# Patient Record
Sex: FEMALE | Race: WHITE | NOT HISPANIC OR LATINO | Employment: OTHER | URBAN - METROPOLITAN AREA
[De-identification: names, ages, dates, MRNs, and addresses within clinical notes are randomized per-mention and may not be internally consistent; named-entity substitution may affect disease eponyms.]

---

## 2019-11-05 ENCOUNTER — HISTORICAL (OUTPATIENT)
Dept: LAB | Facility: HOSPITAL | Age: 59
End: 2019-11-05

## 2020-03-16 ENCOUNTER — HISTORICAL (OUTPATIENT)
Dept: LAB | Facility: HOSPITAL | Age: 60
End: 2020-03-16

## 2020-03-19 LAB — FINAL CULTURE: NORMAL

## 2022-11-15 PROBLEM — D64.9 ANEMIA: Status: ACTIVE | Noted: 2022-11-15

## 2022-11-15 NOTE — PROGRESS NOTES
Subjective:       Patient ID: Janette Whitfield is a 62 y.o. female.    Chief Complaint: Follow-up (Pt states that she has been vomiting bile for the past 2 days she is unable to eat and she is also a diabetic, pt states she is not feeling to well today, fatigue, stomach pain, jittery, pt states she took her meds this morning but she most likely when she vomit they came up )      HPI      Janette Whitfield  62 y.o.  female with past medical history significant for bipolar disorder, HTN, DM, CVA, scarlet fever, seizures, asthma, syncope, COPD, depression, HLD, anxiety, fibromyalgia, scabies, PUD/GERD, accidental lithium overdose referred to [facility] for anemia and thrombocytosis    Colonoscopy 4/27/20 with Dr De La Rosa, internal hemorrhoids, polyp, diverticulosis.    Interval History:     Patient is here for follow-up and complaining of some nausea with vomiting for the last 2 days.  She just had a cholecystectomy done last month and is going to surgery clinic after here to be evaluated for this is abdominal pain and vomiting with a plan to remove the staples.     She has been taking iron twice daily, folic acid daily and has not been taking vitamin B12 for more than a year now.  Denies any blood in urine bowel movements other than having intermittent small amount of blood from hemorrhoids.  Denies any vaginal bleeding.  Denies any chest pain ,shortness of breath or palpitations.           Past Medical History:   Diagnosis Date    Anemia, unspecified       Past Surgical History:   Procedure Laterality Date    CHOLECYSTECTOMY       Social History     Socioeconomic History    Marital status: Unknown   Tobacco Use    Smoking status: Never    Smokeless tobacco: Never   Substance and Sexual Activity    Alcohol use: Not Currently    Drug use: Never    Sexual activity: Not Currently      History reviewed. No pertinent family history.   Review of patient's allergies indicates:   Allergen Reactions    Meperidine Other (See  Comments) and Shortness Of Breath     Pt stated it gives her the demerol jumps  Pt stated it gives her the demerol jumps      Hydrocodone Nausea Only      Review of Systems   Constitutional: Negative.  Negative for activity change, chills, fatigue and fever.   HENT: Negative.     Eyes: Negative.    Respiratory:  Negative for cough and shortness of breath.    Cardiovascular:  Negative for chest pain and leg swelling.   Gastrointestinal:  Positive for abdominal distention, nausea and vomiting. Negative for constipation and diarrhea.   Endocrine: Negative.    Genitourinary: Negative.    Musculoskeletal:  Negative for arthralgias and myalgias.   Integumentary:  Negative.   Allergic/Immunologic: Negative.    Neurological:  Negative for light-headedness, numbness and headaches.   Hematological: Negative.    Psychiatric/Behavioral: Negative.         Objective:        Vitals:    11/16/22 1041   BP: (!) 147/85   Pulse: (!) 126   Resp: 18   Temp: 98.4 °F (36.9 °C)        Physical Exam  Constitutional:       General: She is not in acute distress.     Appearance: She is well-developed. She is not ill-appearing.   HENT:      Head: Normocephalic and atraumatic.      Mouth/Throat:      Mouth: Mucous membranes are moist.   Eyes:      Extraocular Movements: Extraocular movements intact.      Conjunctiva/sclera: Conjunctivae normal.   Cardiovascular:      Rate and Rhythm: Normal rate and regular rhythm.      Heart sounds: Normal heart sounds.   Pulmonary:      Effort: Pulmonary effort is normal. No respiratory distress.      Breath sounds: Normal breath sounds. No wheezing.   Abdominal:      General: Bowel sounds are normal. There is no distension.      Palpations: Abdomen is soft.      Tenderness: There is abdominal tenderness.      Comments: Dressing at site of surgery   Musculoskeletal:         General: Normal range of motion.      Cervical back: Normal range of motion and neck supple.   Skin:     General: Skin is warm.    Neurological:      General: No focal deficit present.      Mental Status: She is alert and oriented to person, place, and time. Mental status is at baseline.      Cranial Nerves: No cranial nerve deficit.   Psychiatric:         Mood and Affect: Mood normal.       LABS AND IMAGING REVIEWED IN EPIC    22:     WBC 7, hemoglobin 10.4, MCV 88, platelets 614  2022.  WBC 12.99, hemoglobin 9.6, MCV 83.3, platelets 921, creatinine 0.64,  22 WBC 7.1, Hgb 12.2, , , iron 290, folate  16.2, B12 410, Ferritin 47, Retic 1.6%  21 WBC 9.4 RBC 4.49 Hg 12.7 MCV 86 RDW 13.2  ANC 6.2 Cr 0.76 Alb 4.6 TP 7.6 AlkPhos 168 AST 16 ALT 20 Folate 15.6 B12 442 Iron 68 Ferritin 33 Retic 1.9%  21 WBC 7.9, Hgb 12.7, MCV 84, , ANC 5.7, Cr 0.79, AlkPhos 176, iron 79, TIBC 303, Ferritin 38, folate > 20, B12 556, retic 1.5%  21 WBC 7.8, Hg 11.7, MCV 83, , ANC 5.1, Cr 0.72, alkphos 177, iron 41, TIBC 306, ferritin 17, folate 7.6, vitamin B12 379, reticulocyte 1.5%  7/15/20 Hgb 11.7, MCV 74, , Retic 1.4%, RDW 22.1, AlkPhos 159, , Iron 98, TIBC 291, Ferritin 25, B12 480, Folate 15.7, CRP 7.98 ESR 40, EPO 21.3  3/3/20 MMA 0.14 FOBT x3 negative Cr 0.79  Iron 19 TIBC 358 Ferritin 22 folate 7.33 12 272 Hep B s ag neg Hep B s ab neg HIV neg Hep C neg WBC 10.3 RBC 3.83 Hg 9.6 MCV 79.4 RDW 14.3  ANC 7.4 Abs retic ~75k Direct Noe neg MIC neg Copper 162 Hapto 167 SPEP normal SFLC normal  1/15/20 TIBC 327 Iron 49 B12 277 ferritin 19 WBC 10.2 Hg 10.3 MCV 82 RDW 16.3  ANC 6.9 ALC 1.9 AMC 1 AEC 0.1 ABC 0.1 TSH 2.43 FT4 0.87  19 Cr 0.78 Tbili 0.2 AST 15 ALT 17 WBC 11.06 RBC 3.64 Hg 9.3 MCV 81.3  RDW 15.1 ANC 8.06  18 Hg 11.6 MCV 81.8   8/27/15 Hg 14.6 MCV 87          Imagin21 MMG: benign  19 head CT: Mild volume loss, with no acute abnormality  19 US right breast: multiple small subcm cysts in right breast. Not  suspicious for malignancy  6/28/19 MMG: 3 round to ovoid masses in right breast up to 12mm        Assessment:       1. Anemia D64.9  - Likely due to polypharmacy and multiple psychiatric meds. Tegretol most likely culprit  - Initial workup showed borderline iron, B12 and folic acid indices, started on all 3 4/2020  - In the past she has been noncompliant with oral iron, B12 and folic acid.  - Most recent lab work shows improvement in ferritin, B12 and folate level. Hgb also improved at 12.2  - 11/14/22: ferritin 151, retic 4.3, B12 733, folate 12, iron saturation 14    Thrombocytosis:   - Thrombocytosis likely reactive after cholecystectomy.  platelet counts have been around 400 but after cholecystectomy were noted to be 921 and repeated with level of 614, which is trending down, does will continue to monitor for now  Plan:     - C/w oral iron daily, folic acid.   -  normal B12 despite not being on an B12 for a year so will hold onto restarting it  - Colonoscopy 4/27/20, will obtain path from polypectomy. Repeat colonoscopy due in 4 years per patient.  - She is considering hemorrhoidal banding with Dr. De La Rosa.  - Occasional abnormal bruising likely due to trazodone and duloxetine  - prescribed Zofran and Reglan prn for nausea and vomiting  - recommended to follow up with the surgeon today for evaluation of the abdominal pain and nausea and vomiting.  If unable to see the surgeon then she should go to the ER  - MD / LABS VISIT - 6 WEEKS     The patient was seen, interviewed and examined. Pertinent lab and radiology studies were reviewed. Pt instructed to call should develop concerning signs/symptoms or have further questions.       Madhu Venegas MD  Hematology / Oncology

## 2022-11-16 ENCOUNTER — OFFICE VISIT (OUTPATIENT)
Dept: HEMATOLOGY/ONCOLOGY | Facility: CLINIC | Age: 62
End: 2022-11-16
Payer: MEDICAID

## 2022-11-16 VITALS
HEART RATE: 126 BPM | OXYGEN SATURATION: 97 % | WEIGHT: 231.38 LBS | DIASTOLIC BLOOD PRESSURE: 85 MMHG | RESPIRATION RATE: 18 BRPM | SYSTOLIC BLOOD PRESSURE: 147 MMHG | TEMPERATURE: 98 F

## 2022-11-16 DIAGNOSIS — R11.14 BILIOUS VOMITING WITH NAUSEA: ICD-10-CM

## 2022-11-16 DIAGNOSIS — R10.13 DYSPEPSIA: Primary | ICD-10-CM

## 2022-11-16 DIAGNOSIS — D75.839 THROMBOCYTOSIS: ICD-10-CM

## 2022-11-16 DIAGNOSIS — D64.9 ANEMIA, UNSPECIFIED TYPE: ICD-10-CM

## 2022-11-16 PROCEDURE — 99214 OFFICE O/P EST MOD 30 MIN: CPT | Mod: ,,, | Performed by: INTERNAL MEDICINE

## 2022-11-16 PROCEDURE — 99214 PR OFFICE/OUTPT VISIT, EST, LEVL IV, 30-39 MIN: ICD-10-PCS | Mod: ,,, | Performed by: INTERNAL MEDICINE

## 2022-11-16 PROCEDURE — 1160F PR REVIEW ALL MEDS BY PRESCRIBER/CLIN PHARMACIST DOCUMENTED: ICD-10-PCS | Mod: CPTII,,, | Performed by: INTERNAL MEDICINE

## 2022-11-16 PROCEDURE — 3079F DIAST BP 80-89 MM HG: CPT | Mod: CPTII,,, | Performed by: INTERNAL MEDICINE

## 2022-11-16 PROCEDURE — 1160F RVW MEDS BY RX/DR IN RCRD: CPT | Mod: CPTII,,, | Performed by: INTERNAL MEDICINE

## 2022-11-16 PROCEDURE — 1159F MED LIST DOCD IN RCRD: CPT | Mod: CPTII,,, | Performed by: INTERNAL MEDICINE

## 2022-11-16 PROCEDURE — 3077F SYST BP >= 140 MM HG: CPT | Mod: CPTII,,, | Performed by: INTERNAL MEDICINE

## 2022-11-16 PROCEDURE — 3079F PR MOST RECENT DIASTOLIC BLOOD PRESSURE 80-89 MM HG: ICD-10-PCS | Mod: CPTII,,, | Performed by: INTERNAL MEDICINE

## 2022-11-16 PROCEDURE — 3077F PR MOST RECENT SYSTOLIC BLOOD PRESSURE >= 140 MM HG: ICD-10-PCS | Mod: CPTII,,, | Performed by: INTERNAL MEDICINE

## 2022-11-16 PROCEDURE — 1159F PR MEDICATION LIST DOCUMENTED IN MEDICAL RECORD: ICD-10-PCS | Mod: CPTII,,, | Performed by: INTERNAL MEDICINE

## 2022-11-16 RX ORDER — GABAPENTIN 300 MG/1
300 CAPSULE ORAL 3 TIMES DAILY
COMMUNITY
Start: 2022-10-10

## 2022-11-16 RX ORDER — FOLIC ACID 1 MG/1
1000 TABLET ORAL DAILY
COMMUNITY
Start: 2022-08-02 | End: 2023-12-05 | Stop reason: SDUPTHER

## 2022-11-16 RX ORDER — ONDANSETRON 8 MG/1
8 TABLET, ORALLY DISINTEGRATING ORAL EVERY 12 HOURS PRN
Qty: 45 TABLET | Refills: 3 | Status: SHIPPED | OUTPATIENT
Start: 2022-11-16

## 2022-11-16 RX ORDER — CLONAZEPAM 2 MG/1
2 TABLET ORAL 2 TIMES DAILY
COMMUNITY
Start: 2022-10-19

## 2022-11-16 RX ORDER — ONDANSETRON 8 MG/1
8 TABLET, ORALLY DISINTEGRATING ORAL 3 TIMES DAILY PRN
COMMUNITY
Start: 2022-09-07 | End: 2022-11-16 | Stop reason: SDUPTHER

## 2022-11-16 RX ORDER — PANTOPRAZOLE SODIUM 40 MG/1
40 TABLET, DELAYED RELEASE ORAL DAILY
COMMUNITY
Start: 2022-10-26

## 2022-11-16 RX ORDER — DULOXETIN HYDROCHLORIDE 60 MG/1
60 CAPSULE, DELAYED RELEASE ORAL 2 TIMES DAILY
COMMUNITY
Start: 2022-10-26

## 2022-11-16 RX ORDER — FLUTICASONE PROPIONATE 50 MCG
1 SPRAY, SUSPENSION (ML) NASAL 2 TIMES DAILY
COMMUNITY

## 2022-11-16 RX ORDER — METOCLOPRAMIDE 5 MG/1
5 TABLET ORAL
Qty: 20 TABLET | Refills: 0 | Status: SHIPPED | OUTPATIENT
Start: 2022-11-16 | End: 2023-11-16

## 2022-11-16 RX ORDER — METOPROLOL SUCCINATE 50 MG/1
50 TABLET, EXTENDED RELEASE ORAL DAILY
COMMUNITY
Start: 2022-10-31

## 2022-11-16 RX ORDER — FAMOTIDINE 40 MG/1
40 TABLET, FILM COATED ORAL 2 TIMES DAILY
COMMUNITY
Start: 2022-09-07

## 2022-11-16 RX ORDER — AMLODIPINE BESYLATE 10 MG/1
10 TABLET ORAL DAILY
COMMUNITY
Start: 2022-11-10

## 2022-11-16 RX ORDER — DUPILUMAB 300 MG/2ML
300 INJECTION, SOLUTION SUBCUTANEOUS
COMMUNITY

## 2022-11-16 RX ORDER — FERROUS SULFATE 325(65) MG
325 TABLET ORAL DAILY
Qty: 60 TABLET | Refills: 5 | Status: SHIPPED | OUTPATIENT
Start: 2022-11-16 | End: 2023-12-05 | Stop reason: SDUPTHER

## 2022-11-16 RX ORDER — METFORMIN HYDROCHLORIDE 500 MG/1
500 TABLET ORAL 2 TIMES DAILY
COMMUNITY
Start: 2022-08-31

## 2022-11-16 RX ORDER — TIZANIDINE 4 MG/1
4 TABLET ORAL 2 TIMES DAILY
COMMUNITY
Start: 2022-11-10

## 2022-11-16 RX ORDER — CARBAMAZEPINE 400 MG/1
400 TABLET, EXTENDED RELEASE ORAL 2 TIMES DAILY
COMMUNITY
Start: 2022-07-01

## 2022-11-16 RX ORDER — PREDNISONE 20 MG/1
20 TABLET ORAL 2 TIMES DAILY
COMMUNITY
Start: 2022-09-12

## 2022-11-16 RX ORDER — ALBUTEROL SULFATE 0.83 MG/ML
2.5 SOLUTION RESPIRATORY (INHALATION) EVERY 6 HOURS PRN
COMMUNITY
Start: 2022-09-12

## 2022-11-16 RX ORDER — FUROSEMIDE 80 MG/1
80 TABLET ORAL DAILY PRN
COMMUNITY

## 2022-11-16 RX ORDER — ATORVASTATIN CALCIUM 40 MG/1
40 TABLET, FILM COATED ORAL DAILY
COMMUNITY
Start: 2022-10-26

## 2022-11-16 RX ORDER — LEVOCETIRIZINE DIHYDROCHLORIDE 5 MG/1
5 TABLET, FILM COATED ORAL DAILY
COMMUNITY
Start: 2022-10-01

## 2022-11-16 RX ORDER — FERROUS SULFATE 325(65) MG
325 TABLET ORAL 2 TIMES DAILY
COMMUNITY
End: 2022-11-16 | Stop reason: SDUPTHER

## 2022-11-16 RX ORDER — HYDROCODONE BITARTRATE AND ACETAMINOPHEN 10; 325 MG/1; MG/1
1 TABLET ORAL EVERY 6 HOURS PRN
COMMUNITY
Start: 2022-10-19

## 2022-11-16 RX ORDER — TRAZODONE HYDROCHLORIDE 100 MG/1
200 TABLET ORAL NIGHTLY
COMMUNITY
Start: 2022-10-26

## 2022-11-16 RX ORDER — ERGOCALCIFEROL 1.25 MG/1
50000 CAPSULE ORAL
COMMUNITY
Start: 2022-10-26

## 2022-11-16 RX ORDER — BLOOD SUGAR DIAGNOSTIC
STRIP MISCELLANEOUS
COMMUNITY
Start: 2022-07-26

## 2023-01-26 ENCOUNTER — OFFICE VISIT (OUTPATIENT)
Dept: HEMATOLOGY/ONCOLOGY | Facility: CLINIC | Age: 63
End: 2023-01-26
Payer: MEDICAID

## 2023-01-26 VITALS
HEART RATE: 89 BPM | DIASTOLIC BLOOD PRESSURE: 71 MMHG | HEIGHT: 65 IN | OXYGEN SATURATION: 95 % | BODY MASS INDEX: 38.15 KG/M2 | RESPIRATION RATE: 18 BRPM | WEIGHT: 229 LBS | TEMPERATURE: 98 F | SYSTOLIC BLOOD PRESSURE: 106 MMHG

## 2023-01-26 DIAGNOSIS — D75.839 THROMBOCYTOSIS: ICD-10-CM

## 2023-01-26 DIAGNOSIS — D64.9 ANEMIA, UNSPECIFIED TYPE: Primary | ICD-10-CM

## 2023-01-26 PROCEDURE — 3078F DIAST BP <80 MM HG: CPT | Mod: CPTII,,,

## 2023-01-26 PROCEDURE — 1159F MED LIST DOCD IN RCRD: CPT | Mod: CPTII,,,

## 2023-01-26 PROCEDURE — 99214 OFFICE O/P EST MOD 30 MIN: CPT | Mod: ,,,

## 2023-01-26 PROCEDURE — 3074F PR MOST RECENT SYSTOLIC BLOOD PRESSURE < 130 MM HG: ICD-10-PCS | Mod: CPTII,,,

## 2023-01-26 PROCEDURE — 3074F SYST BP LT 130 MM HG: CPT | Mod: CPTII,,,

## 2023-01-26 PROCEDURE — 3008F BODY MASS INDEX DOCD: CPT | Mod: CPTII,,,

## 2023-01-26 PROCEDURE — 3078F PR MOST RECENT DIASTOLIC BLOOD PRESSURE < 80 MM HG: ICD-10-PCS | Mod: CPTII,,,

## 2023-01-26 PROCEDURE — 3008F PR BODY MASS INDEX (BMI) DOCUMENTED: ICD-10-PCS | Mod: CPTII,,,

## 2023-01-26 PROCEDURE — 1159F PR MEDICATION LIST DOCUMENTED IN MEDICAL RECORD: ICD-10-PCS | Mod: CPTII,,,

## 2023-01-26 PROCEDURE — 99214 PR OFFICE/OUTPT VISIT, EST, LEVL IV, 30-39 MIN: ICD-10-PCS | Mod: ,,,

## 2023-01-26 RX ORDER — ASPIRIN 81 MG/1
81 TABLET ORAL DAILY
COMMUNITY

## 2023-01-26 RX ORDER — ESOMEPRAZOLE MAGNESIUM 40 MG/1
40 CAPSULE, DELAYED RELEASE ORAL
COMMUNITY

## 2023-01-26 RX ORDER — BREXPIPRAZOLE 2 MG/1
2 TABLET ORAL
COMMUNITY

## 2023-01-26 RX ORDER — BISMUTH SUBCITRATE POTASSIUM, METRONIDAZOLE AND TETRACYCLINE HYDROCHLORIDE 140; 125; 125 MG/1; MG/1; MG/1
3 CAPSULE ORAL
COMMUNITY

## 2023-01-26 NOTE — PROGRESS NOTES
Subjective:       Patient ID: Janette Whitfield is a 62 y.o. female.    Chief Complaint: Follow-up        HPI      Janette Whitfield  62 y.o.  female with past medical history significant for bipolar disorder, HTN, DM, CVA, scarlet fever, seizures, asthma, syncope, COPD, depression, HLD, anxiety, fibromyalgia, scabies, PUD/GERD, accidental lithium overdose referred to [facility] for anemia and thrombocytosis    Colonoscopy 4/27/20 with Dr De La Rosa, internal hemorrhoids, polyp, diverticulosis.    Interval History:     Today 1/26/23: Patient seen today in the office for follow-up visit. She states she has been taking oral iron twice daily and folic acid and B12 once daily as directed. She denies fevers, chills, weight loss, early satiety. Denies chris hematochezia, melena, denies hematuria. She does suffer from hemorrhoids and states occasionally she does have some blood on the toilet paper after BMs. She is considering scheduling an appointment with Dr. De La Rosa for hemorrhoidal banding procedure. Her depression symptoms are well controlled at this time. She states Dr. Harrison left and she does not have any refills on her Trazodone. She is asking for 1 month supply of Trazodone as she suffers from insomnia and has been on this medication for years. She has been looking for a new PCP but states it is hard to find provider who accepts medicaid. Denies CP, SOB, N/V/D. She continues to report itchy skin and continues with multiple skin excoriations to legs and arms. She was seen by Dr. Anthony in [place] and is applying prescription cream as recommended. No other complaints or concerns reported.  Patient is here for follow-up and complaining of some nausea with vomiting for the last 2 days.  She just had a cholecystectomy done last month and is going to surgery clinic after here to be evaluated for this is abdominal pain and vomiting with a plan to remove the staples.     She has been taking iron twice daily, folic acid daily  and has not been taking vitamin B12 for more than a year now.  Denies any blood in urine bowel movements other than having intermittent small amount of blood from hemorrhoids.  Denies any vaginal bleeding.  Denies any chest pain ,shortness of breath or palpitations.           Past Medical History:   Diagnosis Date    Anemia, unspecified       Past Surgical History:   Procedure Laterality Date    CHOLECYSTECTOMY       Social History     Socioeconomic History    Marital status: Unknown   Tobacco Use    Smoking status: Never    Smokeless tobacco: Never   Substance and Sexual Activity    Alcohol use: Not Currently    Drug use: Never    Sexual activity: Not Currently      No family history on file.   Review of patient's allergies indicates:   Allergen Reactions    Meperidine Other (See Comments) and Shortness Of Breath     Pt stated it gives her the demerol jumps  Pt stated it gives her the demerol jumps      Hydrocodone Nausea Only      Review of Systems   Constitutional: Negative.  Negative for activity change, chills, fatigue and fever.   HENT: Negative.     Eyes: Negative.    Respiratory:  Negative for cough and shortness of breath.    Cardiovascular:  Negative for chest pain and leg swelling.   Gastrointestinal:  Positive for abdominal distention, nausea and vomiting. Negative for constipation and diarrhea.   Endocrine: Negative.    Genitourinary: Negative.    Musculoskeletal:  Negative for arthralgias and myalgias.   Integumentary:  Negative.   Allergic/Immunologic: Negative.    Neurological:  Negative for light-headedness, numbness and headaches.   Hematological: Negative.    Psychiatric/Behavioral: Negative.         Objective:        There were no vitals filed for this visit.       Physical Exam  Constitutional:       General: She is not in acute distress.     Appearance: She is well-developed. She is not ill-appearing.   HENT:      Head: Normocephalic and atraumatic.      Mouth/Throat:      Mouth: Mucous  membranes are moist.   Eyes:      Extraocular Movements: Extraocular movements intact.      Conjunctiva/sclera: Conjunctivae normal.   Cardiovascular:      Rate and Rhythm: Normal rate and regular rhythm.      Heart sounds: Normal heart sounds.   Pulmonary:      Effort: Pulmonary effort is normal. No respiratory distress.      Breath sounds: Normal breath sounds. No wheezing.   Abdominal:      General: Bowel sounds are normal. There is no distension.      Palpations: Abdomen is soft.      Tenderness: There is abdominal tenderness.      Comments: Dressing at site of surgery   Musculoskeletal:         General: Normal range of motion.      Cervical back: Normal range of motion and neck supple.   Skin:     General: Skin is warm.   Neurological:      General: No focal deficit present.      Mental Status: She is alert and oriented to person, place, and time. Mental status is at baseline.      Cranial Nerves: No cranial nerve deficit.   Psychiatric:         Mood and Affect: Mood normal.       LABS AND IMAGING REVIEWED IN EPIC  01/26/23 01/04/23: Cr 0.74, ca 9.3, alb 3.3, alkphos 178, wbc 11.56, hgb 11.2, plt 504  11/14/22:  WBC 7, hemoglobin 10.4, MCV 88, platelets 614  11/03/2022.  WBC 12.99, hemoglobin 9.6, MCV 83.3, platelets 921, creatinine 0.64,  1/20/22 WBC 7.1, Hgb 12.2, , , iron 290, folate  16.2, B12 410, Ferritin 47, Retic 1.6%  7/16/21 WBC 9.4 RBC 4.49 Hg 12.7 MCV 86 RDW 13.2  ANC 6.2 Cr 0.76 Alb 4.6 TP 7.6 AlkPhos 168 AST 16 ALT 20 Folate 15.6 B12 442 Iron 68 Ferritin 33 Retic 1.9%  4/30/21 WBC 7.9, Hgb 12.7, MCV 84, , ANC 5.7, Cr 0.79, AlkPhos 176, iron 79, TIBC 303, Ferritin 38, folate > 20, B12 556, retic 1.5%  2/17/21 WBC 7.8, Hg 11.7, MCV 83, , ANC 5.1, Cr 0.72, alkphos 177, iron 41, TIBC 306, ferritin 17, folate 7.6, vitamin B12 379, reticulocyte 1.5%  7/15/20 Hgb 11.7, MCV 74, , Retic 1.4%, RDW 22.1, AlkPhos 159, , Iron 98, TIBC 291, Ferritin 25, B12 480,  Folate 15.7, CRP 7.98 ESR 40, EPO 21.3  3/3/20 MMA 0.14 FOBT x3 negative Cr 0.79  Iron 19 TIBC 358 Ferritin 22 folate 7.33 12 272 Hep B s ag neg Hep B s ab neg HIV neg Hep C neg WBC 10.3 RBC 3.83 Hg 9.6 MCV 79.4 RDW 14.3  ANC 7.4 Abs retic ~75k Direct Noe neg MIC neg Copper 162 Hapto 167 SPEP normal SFLC normal  1/15/20 TIBC 327 Iron 49 B12 277 ferritin 19 WBC 10.2 Hg 10.3 MCV 82 RDW 16.3  ANC 6.9 ALC 1.9 AMC 1 AEC 0.1 ABC 0.1 TSH 2.43 FT4 0.87  19 Cr 0.78 Tbili 0.2 AST 15 ALT 17 WBC 11.06 RBC 3.64 Hg 9.3 MCV 81.3  RDW 15.1 ANC 8.06  18 Hg 11.6 MCV 81.8   8/27/15 Hg 14.6 MCV 87         Imagin21 MMG: benign  19 head CT: Mild volume loss, with no acute abnormality  19 US right breast: multiple small subcm cysts in right breast. Not suspicious for malignancy  19 MMG: 3 round to ovoid masses in right breast up to 12mm        Assessment:       1. Anemia D64.9  - Likely due to polypharmacy and multiple psychiatric meds. Tegretol most likely culprit  - Initial workup showed borderline iron, B12 and folic acid indices, started on all 3 2020  - In the past she has been noncompliant with oral iron, B12 and folic acid.  - Most recent lab work shows improvement in ferritin, B12 and folate level. Hgb also improved at 12.2  - 22: ferritin 151, retic 4.3, B12 733, folate 12, iron saturation 14    Thrombocytosis:   - Thrombocytosis likely reactive after cholecystectomy and s/p right total knee  platelet counts have been around 400-500 but after cholecystectomy and total knee were noted to be 921 and repeated with level of 614, which is trending down, does will continue to monitor for now  Platelets 504 today  Plan:     - C/w oral iron daily, folic acid.  - RTC 8 months

## 2023-12-05 ENCOUNTER — OFFICE VISIT (OUTPATIENT)
Dept: HEMATOLOGY/ONCOLOGY | Facility: CLINIC | Age: 63
End: 2023-12-05
Payer: COMMERCIAL

## 2023-12-05 VITALS
RESPIRATION RATE: 20 BRPM | WEIGHT: 248 LBS | TEMPERATURE: 98 F | BODY MASS INDEX: 41.32 KG/M2 | HEART RATE: 87 BPM | SYSTOLIC BLOOD PRESSURE: 117 MMHG | DIASTOLIC BLOOD PRESSURE: 80 MMHG | HEIGHT: 65 IN | OXYGEN SATURATION: 98 %

## 2023-12-05 DIAGNOSIS — D64.9 ANEMIA, UNSPECIFIED TYPE: Primary | ICD-10-CM

## 2023-12-05 PROCEDURE — 3008F PR BODY MASS INDEX (BMI) DOCUMENTED: ICD-10-PCS | Mod: CPTII,,,

## 2023-12-05 PROCEDURE — 99214 OFFICE O/P EST MOD 30 MIN: CPT | Mod: ,,,

## 2023-12-05 PROCEDURE — 3008F BODY MASS INDEX DOCD: CPT | Mod: CPTII,,,

## 2023-12-05 PROCEDURE — 1160F RVW MEDS BY RX/DR IN RCRD: CPT | Mod: CPTII,,,

## 2023-12-05 PROCEDURE — 3074F PR MOST RECENT SYSTOLIC BLOOD PRESSURE < 130 MM HG: ICD-10-PCS | Mod: CPTII,,,

## 2023-12-05 PROCEDURE — 1159F MED LIST DOCD IN RCRD: CPT | Mod: CPTII,,,

## 2023-12-05 PROCEDURE — 99214 PR OFFICE/OUTPT VISIT, EST, LEVL IV, 30-39 MIN: ICD-10-PCS | Mod: ,,,

## 2023-12-05 PROCEDURE — 3079F DIAST BP 80-89 MM HG: CPT | Mod: CPTII,,,

## 2023-12-05 PROCEDURE — 3079F PR MOST RECENT DIASTOLIC BLOOD PRESSURE 80-89 MM HG: ICD-10-PCS | Mod: CPTII,,,

## 2023-12-05 PROCEDURE — 1159F PR MEDICATION LIST DOCUMENTED IN MEDICAL RECORD: ICD-10-PCS | Mod: CPTII,,,

## 2023-12-05 PROCEDURE — 1160F PR REVIEW ALL MEDS BY PRESCRIBER/CLIN PHARMACIST DOCUMENTED: ICD-10-PCS | Mod: CPTII,,,

## 2023-12-05 PROCEDURE — 3074F SYST BP LT 130 MM HG: CPT | Mod: CPTII,,,

## 2023-12-05 RX ORDER — FOLIC ACID 1 MG/1
1000 TABLET ORAL DAILY
Qty: 30 TABLET | Refills: 6 | Status: SHIPPED | OUTPATIENT
Start: 2023-12-05

## 2023-12-05 RX ORDER — FERROUS SULFATE 325(65) MG
325 TABLET ORAL DAILY
Qty: 60 TABLET | Refills: 5 | Status: SHIPPED | OUTPATIENT
Start: 2023-12-05

## 2024-05-28 ENCOUNTER — HOSPITAL ENCOUNTER (EMERGENCY)
Facility: HOSPITAL | Age: 64
Discharge: HOME | End: 2024-05-28
Attending: EMERGENCY MEDICINE
Payer: COMMERCIAL

## 2024-05-28 VITALS
DIASTOLIC BLOOD PRESSURE: 79 MMHG | RESPIRATION RATE: 17 BRPM | BODY MASS INDEX: 41.65 KG/M2 | SYSTOLIC BLOOD PRESSURE: 133 MMHG | WEIGHT: 250 LBS | TEMPERATURE: 98 F | HEIGHT: 65 IN | HEART RATE: 98 BPM | OXYGEN SATURATION: 98 %

## 2024-05-28 DIAGNOSIS — R11.10 VOMITING AND DIARRHEA: Primary | ICD-10-CM

## 2024-05-28 DIAGNOSIS — R19.7 VOMITING AND DIARRHEA: Primary | ICD-10-CM

## 2024-05-28 LAB
ALBUMIN SERPL BCP-MCNC: 3.2 G/DL (ref 3.5–5.2)
ALP SERPL-CCNC: 112 U/L (ref 55–135)
ALT SERPL W/O P-5'-P-CCNC: 14 U/L (ref 10–44)
ANION GAP SERPL CALC-SCNC: 7 MMOL/L (ref 3–11)
AST SERPL-CCNC: 17 U/L (ref 10–40)
BASOPHILS # BLD AUTO: 0.03 K/UL (ref 0–0.2)
BASOPHILS NFR BLD: 0.3 % (ref 0–1.9)
BILIRUB SERPL-MCNC: 0.3 MG/DL (ref 0.1–1)
BUN SERPL-MCNC: 8 MG/DL (ref 8–23)
CALCIUM SERPL-MCNC: 9.2 MG/DL (ref 8.7–10.5)
CHLORIDE SERPL-SCNC: 104 MMOL/L (ref 95–110)
CO2 SERPL-SCNC: 27 MMOL/L (ref 23–29)
CREAT SERPL-MCNC: 0.7 MG/DL (ref 0.5–1.4)
DIFFERENTIAL METHOD BLD: ABNORMAL
EOSINOPHIL # BLD AUTO: 0.3 K/UL (ref 0–0.5)
EOSINOPHIL NFR BLD: 2.8 % (ref 0–8)
ERYTHROCYTE [DISTWIDTH] IN BLOOD BY AUTOMATED COUNT: 15.8 % (ref 11.5–14.5)
EST. GFR  (NO RACE VARIABLE): >60 ML/MIN/1.73 M^2
GLUCOSE SERPL-MCNC: 80 MG/DL (ref 70–110)
HCT VFR BLD AUTO: 38.9 % (ref 37–48.5)
HGB BLD-MCNC: 12.7 G/DL (ref 12–16)
IMM GRANULOCYTES # BLD AUTO: 0.03 K/UL (ref 0–0.04)
IMM GRANULOCYTES NFR BLD AUTO: 0.3 % (ref 0–0.5)
LIPASE SERPL-CCNC: 51 U/L (ref 13–75)
LYMPHOCYTES # BLD AUTO: 2 K/UL (ref 1–4.8)
LYMPHOCYTES NFR BLD: 22 % (ref 18–48)
MCH RBC QN AUTO: 26 PG (ref 27–31)
MCHC RBC AUTO-ENTMCNC: 32.6 G/DL (ref 32–36)
MCV RBC AUTO: 80 FL (ref 82–98)
MONOCYTES # BLD AUTO: 0.7 K/UL (ref 0.3–1)
MONOCYTES NFR BLD: 7.6 % (ref 4–15)
NEUTROPHILS # BLD AUTO: 6.2 K/UL (ref 1.8–7.7)
NEUTROPHILS NFR BLD: 67 % (ref 38–73)
NRBC BLD-RTO: 0 /100 WBC
PLATELET # BLD AUTO: 389 K/UL (ref 150–450)
PMV BLD AUTO: 9.2 FL (ref 9.2–12.9)
POTASSIUM SERPL-SCNC: 3.7 MMOL/L (ref 3.5–5.1)
PROT SERPL-MCNC: 7.4 G/DL (ref 6–8.4)
RBC # BLD AUTO: 4.89 M/UL (ref 4–5.4)
SODIUM SERPL-SCNC: 138 MMOL/L (ref 136–145)
WBC # BLD AUTO: 9.22 K/UL (ref 3.9–12.7)

## 2024-05-28 PROCEDURE — 96372 THER/PROPH/DIAG INJ SC/IM: CPT | Mod: 59 | Performed by: NURSE PRACTITIONER

## 2024-05-28 PROCEDURE — 99284 EMERGENCY DEPT VISIT MOD MDM: CPT | Mod: 25

## 2024-05-28 PROCEDURE — 25000003 PHARM REV CODE 250: Performed by: NURSE PRACTITIONER

## 2024-05-28 PROCEDURE — 85025 COMPLETE CBC W/AUTO DIFF WBC: CPT | Performed by: NURSE PRACTITIONER

## 2024-05-28 PROCEDURE — 80053 COMPREHEN METABOLIC PANEL: CPT | Performed by: NURSE PRACTITIONER

## 2024-05-28 PROCEDURE — 96365 THER/PROPH/DIAG IV INF INIT: CPT

## 2024-05-28 PROCEDURE — 83690 ASSAY OF LIPASE: CPT | Performed by: NURSE PRACTITIONER

## 2024-05-28 PROCEDURE — 36415 COLL VENOUS BLD VENIPUNCTURE: CPT | Performed by: NURSE PRACTITIONER

## 2024-05-28 PROCEDURE — 63600175 PHARM REV CODE 636 W HCPCS: Performed by: NURSE PRACTITIONER

## 2024-05-28 RX ORDER — PROMETHAZINE HYDROCHLORIDE 25 MG/1
25 SUPPOSITORY RECTAL EVERY 8 HOURS PRN
Qty: 9 SUPPOSITORY | Refills: 0 | Status: SHIPPED | OUTPATIENT
Start: 2024-05-28 | End: 2024-05-31

## 2024-05-28 RX ORDER — LOPERAMIDE HYDROCHLORIDE 2 MG/1
2 CAPSULE ORAL EVERY 8 HOURS PRN
Qty: 9 CAPSULE | Refills: 0 | Status: SHIPPED | OUTPATIENT
Start: 2024-05-28 | End: 2024-05-31

## 2024-05-28 RX ORDER — DICYCLOMINE HYDROCHLORIDE 10 MG/ML
20 INJECTION INTRAMUSCULAR
Status: COMPLETED | OUTPATIENT
Start: 2024-05-28 | End: 2024-05-28

## 2024-05-28 RX ORDER — LOPERAMIDE HYDROCHLORIDE 2 MG/1
4 CAPSULE ORAL
Status: COMPLETED | OUTPATIENT
Start: 2024-05-28 | End: 2024-05-28

## 2024-05-28 RX ORDER — PROMETHAZINE HYDROCHLORIDE 25 MG/1
25 TABLET ORAL EVERY 8 HOURS PRN
Qty: 9 TABLET | Refills: 0 | Status: SHIPPED | OUTPATIENT
Start: 2024-05-28 | End: 2024-05-31

## 2024-05-28 RX ADMIN — DICYCLOMINE HYDROCHLORIDE 20 MG: 10 INJECTION, SOLUTION INTRAMUSCULAR at 05:05

## 2024-05-28 RX ADMIN — LOPERAMIDE HYDROCHLORIDE 4 MG: 2 CAPSULE ORAL at 06:05

## 2024-05-28 RX ADMIN — SODIUM CHLORIDE 1000 ML: 9 INJECTION, SOLUTION INTRAVENOUS at 05:05

## 2024-05-28 RX ADMIN — PROMETHAZINE HYDROCHLORIDE 25 MG: 25 INJECTION INTRAMUSCULAR; INTRAVENOUS at 05:05

## 2024-05-28 NOTE — ED PROVIDER NOTES
Encounter Date: 5/28/2024       History     Chief Complaint   Patient presents with    Vomiting     N/V/D, onset yesterday.      This is a 64-year-old white female with a history of anemia, diabetes mellitus type 2, and hypertension who presents to the emergency department with complaints of vomiting and diarrhea that began last night.  She reports experiencing frequent episodes vomiting and diarrhea throughout the night and reports minimal slacking of symptoms today.  She has experienced nausea all day with inability to eat or drink, reporting 1 episode of vomiting earlier this afternoon.  She has continued to experience diarrhea that is watery, not black or bloody.  She reports generalized weakness and has had some mild intermittent upper abdominal cramping.  She denies exposure to others with similar symptoms, denies known fever, URI signs and symptoms, dysuria, or any other relative symptoms at this time.      Review of patient's allergies indicates:   Allergen Reactions    Meperidine Other (See Comments) and Shortness Of Breath     Pt stated it gives her the demerol jumps  Pt stated it gives her the demerol jumps      Hydrocodone Nausea Only     Past Medical History:   Diagnosis Date    Anemia, unspecified     Cataract     Depression     Diabetes mellitus     Hypertension      Past Surgical History:   Procedure Laterality Date    CHOLECYSTECTOMY      WRIST SURGERY Left 03/2023     No family history on file.  Social History     Tobacco Use    Smoking status: Never    Smokeless tobacco: Never   Substance Use Topics    Alcohol use: Not Currently    Drug use: Never     Review of Systems   Constitutional:  Positive for appetite change and fatigue. Negative for fever.   HENT:  Negative for congestion and rhinorrhea.    Respiratory:  Negative for cough.    Gastrointestinal:  Positive for abdominal pain, diarrhea, nausea and vomiting.   Genitourinary:  Negative for dysuria.   Neurological:  Positive for weakness.        Physical Exam     Initial Vitals [05/28/24 1717]   BP Pulse Resp Temp SpO2   136/81 97 20 98.2 °F (36.8 °C) 96 %      MAP       --         Physical Exam    Nursing note and vitals reviewed.  Constitutional: She appears well-developed and well-nourished. She is active. No distress.   HENT:   Head: Normocephalic and atraumatic.   Dry oral mucous membranes   Eyes: EOM are normal. Pupils are equal, round, and reactive to light.   Neck: Neck supple.   Normal range of motion.  Cardiovascular:  Normal rate, regular rhythm and normal heart sounds.           Pulmonary/Chest: Breath sounds normal. No respiratory distress.   Abdominal: Abdomen is soft. Bowel sounds are normal. She exhibits no distension. There is no abdominal tenderness.   Musculoskeletal:         General: Normal range of motion.      Cervical back: Normal range of motion and neck supple.     Neurological: She is alert and oriented to person, place, and time. GCS score is 15. GCS eye subscore is 4. GCS verbal subscore is 5. GCS motor subscore is 6.   Skin: Skin is warm and dry. Capillary refill takes less than 2 seconds.   Psychiatric: She has a normal mood and affect. Her behavior is normal. Thought content normal.         ED Course   Procedures  Labs Reviewed   CBC W/ AUTO DIFFERENTIAL - Abnormal; Notable for the following components:       Result Value    MCV 80 (*)     MCH 26.0 (*)     RDW 15.8 (*)     All other components within normal limits   COMPREHENSIVE METABOLIC PANEL - Abnormal; Notable for the following components:    Albumin 3.2 (*)     All other components within normal limits   LIPASE          Imaging Results    None          Medications   sodium chloride 0.9% bolus 1,000 mL 1,000 mL (0 mLs Intravenous Stopped 5/28/24 1845)   promethazine (PHENERGAN) 25 mg in dextrose 5 % (D5W) 50 mL IVPB (0 mg Intravenous Stopped 5/28/24 1823)   dicyclomine injection 20 mg (20 mg Intramuscular Given 5/28/24 1754)   loperamide capsule 4 mg (4 mg Oral  Given 5/28/24 1840)     Medical Decision Making  Amount and/or Complexity of Data Reviewed  Labs: ordered.    Risk  Prescription drug management.               ED Course as of 05/28/24 1907   Tue May 28, 2024   1903 All labs relatively unremarkable, no leukocytosis or sign of dehydration.  Patient has not had vomiting or diarrhea since arrival to ED and reports improvement in symptoms after receiving IV fluids and anti emetics.  Symptoms most likely secondary to underlying viral syndrome.  Patient instructed on strict return precautions and dietary modifications. [CB]      ED Course User Index  [CB] Sheyla Medina NP                           Clinical Impression:  Final diagnoses:  [R11.10, R19.7] Vomiting and diarrhea (Primary)          ED Disposition Condition    Discharge Stable          ED Prescriptions       Medication Sig Dispense Start Date End Date Auth. Provider    promethazine (PHENERGAN) 25 MG tablet Take 1 tablet (25 mg total) by mouth every 8 (eight) hours as needed for Nausea (Nausea, vomiting). 9 tablet 5/28/2024 5/31/2024 Sheyla Medina NP    promethazine (PHENERGAN) 25 MG suppository Place 1 suppository (25 mg total) rectally every 8 (eight) hours as needed for Nausea (Nausea, vomiting). 9 suppository 5/28/2024 5/31/2024 Sheyla Medina NP    loperamide (IMODIUM) 2 mg capsule Take 1 capsule (2 mg total) by mouth every 8 (eight) hours as needed for Diarrhea. 9 capsule 5/28/2024 5/31/2024 Sheyla Medina NP          Follow-up Information       Follow up With Specialties Details Why Contact Info    PCP Follow UP  Schedule an appointment as soon as possible for a visit in 2 days for follow-up, for re-evaluation of today's complaint              Sheyla Medina NP  05/28/24 1907

## 2025-05-02 DIAGNOSIS — M51.369 OTHER INTERVERTEBRAL DISC DEGENERATION OF LUMBAR REGION WITHOUT LUMBAR BACK PAIN OR LOWER EXTREMITY PAIN: Primary | ICD-10-CM

## 2025-05-26 ENCOUNTER — TELEPHONE (OUTPATIENT)
Facility: CLINIC | Age: 65
End: 2025-05-26
Payer: MEDICARE

## 2025-05-26 NOTE — TELEPHONE ENCOUNTER
Patient is being referred to Dr. Sagar Gastelum and is scheduled on 06/02/2025 at 10:00 .    Patient Information:  Referred by: No ref. provider found  Reason for referral: M51.369 (ICD-10-CM) - Other intervertebral disc degeneration of lumbar region without lumbar back pain or lower extremity pain     Pain Assessment:  Location(s) of pain: lower back pain that radiates to left hip and wraps around the left leg between the thigh and the groin area.  The pain goes down the leg to top of the foot and big toe with numbness and tingling in the leg and foot.  Pain duration: [How long has the patient been experiencing pain?]: July, 2024   Is this accident or work related injury? Not pertinent  Is there an  involved? Not pertinent    Medication Management:  Medication management for Pain provided patient moderate-significant benefit tizandine and gabapentin  Diabetes Mellitus Management (check for insulin use): Yes and pertinent for DM  Opioid use: Patient has no current use of opiates. Previous use of opiates: yes - HYDROcodone-acetaminophen (NORCO)  mg    Current use of blood thinning medication: ASA for 1° prevention and not on any antiplatelet agents and not on any anticoagulants  PCP: Laron Angel  Cardiologist: Saad Khan MD- [facility] [place] [facility] in [place]      Non-Medication Management:  Non-Medication Measures  Patient has previously attempted the following non-medication conservative measures Icy Hot gel for his current presentation with mild-moderate benefit from Icy Hot gel - Patient went to Physical Therapy twice several years ago - unable to recall.    Interventional Procedures  Injections: Not pertinent  Surgery of the area of pain: Not pertinent  Other: Yes and pertinent for She went to Dr. Alex Cooley who she thinks did some kind of procedure to her knee.    Additional History:  Previous multiple ER visits related to pain: Yes and pertinent for she went to ER at  [facility]  Imaging in last 3 years of the anatomic region: MRI of the Lumbar Spine/Low Back dated 12/11/2024 with report available in epic imaging section      I had an extensive discussion with the patient with respect to what to expect on the day of clinical visit:  Arrival 30 minutes prior to her scheduled visit to allow reasonable time for check in, pain forms to fill prior to the visit, assessment by the pain staff prior to the visit with Dr. Gastelum.  History, Physical exam and a detailed discussion of possible pain generators for the patient.  For management strategies, Dr. Gastelum practices multimodal management of chronic pain which includes non interventional and nonmedicated measures including physical therapy, occupational therapy, nutritional change, pain psychology.  With respect to medication management Dr. Gastelum does not typically offer or take over opioids prescribed by other physicians and would need further in-person evaluation to make that decision.  Additionally Dr. Gatselum offers interventional strategies that include injections for nerve pain, arthritic pain of spine and joints of the body, disc injections, procedures to address bone related pain, compression fractures of spine and advanced procedures including spinal cord stimulators.  As a chronic pain team our goal is to work on improving your functionality first that is your ability to perform things that you would generally enjoy and also improve your pain along the process. Pain alleviation can sometimes longer than functional improvement.   All questions and concerns were addressed at this telephonic visit with the patient.  Patient confirmed the appointment.

## 2025-06-19 ENCOUNTER — OFFICE VISIT (OUTPATIENT)
Facility: CLINIC | Age: 65
End: 2025-06-19
Payer: MEDICARE

## 2025-06-19 VITALS
HEIGHT: 65 IN | DIASTOLIC BLOOD PRESSURE: 74 MMHG | BODY MASS INDEX: 41.65 KG/M2 | OXYGEN SATURATION: 94 % | SYSTOLIC BLOOD PRESSURE: 107 MMHG | WEIGHT: 250 LBS | HEART RATE: 78 BPM

## 2025-06-19 DIAGNOSIS — M47.816 FACET ARTHRITIS OF LUMBAR REGION: ICD-10-CM

## 2025-06-19 DIAGNOSIS — M75.51 SUBACROMIAL BURSITIS OF BOTH SHOULDERS: ICD-10-CM

## 2025-06-19 DIAGNOSIS — M25.562 CHRONIC PAIN OF BOTH KNEES: ICD-10-CM

## 2025-06-19 DIAGNOSIS — R53.81 PHYSICAL DECONDITIONING: ICD-10-CM

## 2025-06-19 DIAGNOSIS — G89.29 CHRONIC PAIN OF BOTH KNEES: ICD-10-CM

## 2025-06-19 DIAGNOSIS — M47.812 FACET ARTHRITIS OF CERVICAL REGION: ICD-10-CM

## 2025-06-19 DIAGNOSIS — M25.561 CHRONIC PAIN OF BOTH KNEES: ICD-10-CM

## 2025-06-19 DIAGNOSIS — M75.52 SUBACROMIAL BURSITIS OF BOTH SHOULDERS: ICD-10-CM

## 2025-06-19 DIAGNOSIS — M54.16 LUMBAR RADICULOPATHY, CHRONIC: Primary | ICD-10-CM

## 2025-06-19 DIAGNOSIS — Z99.3 WHEELCHAIR DEPENDENT: ICD-10-CM

## 2025-06-19 PROCEDURE — 3078F DIAST BP <80 MM HG: CPT | Mod: CPTII,,, | Performed by: STUDENT IN AN ORGANIZED HEALTH CARE EDUCATION/TRAINING PROGRAM

## 2025-06-19 PROCEDURE — 3074F SYST BP LT 130 MM HG: CPT | Mod: CPTII,,, | Performed by: STUDENT IN AN ORGANIZED HEALTH CARE EDUCATION/TRAINING PROGRAM

## 2025-06-19 PROCEDURE — 3008F BODY MASS INDEX DOCD: CPT | Mod: CPTII,,, | Performed by: STUDENT IN AN ORGANIZED HEALTH CARE EDUCATION/TRAINING PROGRAM

## 2025-06-19 PROCEDURE — 3288F FALL RISK ASSESSMENT DOCD: CPT | Mod: CPTII,,, | Performed by: STUDENT IN AN ORGANIZED HEALTH CARE EDUCATION/TRAINING PROGRAM

## 2025-06-19 PROCEDURE — 99205 OFFICE O/P NEW HI 60 MIN: CPT | Mod: ,,, | Performed by: STUDENT IN AN ORGANIZED HEALTH CARE EDUCATION/TRAINING PROGRAM

## 2025-06-19 PROCEDURE — G2211 COMPLEX E/M VISIT ADD ON: HCPCS | Mod: ,,, | Performed by: STUDENT IN AN ORGANIZED HEALTH CARE EDUCATION/TRAINING PROGRAM

## 2025-06-19 PROCEDURE — 1101F PT FALLS ASSESS-DOCD LE1/YR: CPT | Mod: CPTII,,, | Performed by: STUDENT IN AN ORGANIZED HEALTH CARE EDUCATION/TRAINING PROGRAM

## 2025-06-19 PROCEDURE — 1159F MED LIST DOCD IN RCRD: CPT | Mod: CPTII,,, | Performed by: STUDENT IN AN ORGANIZED HEALTH CARE EDUCATION/TRAINING PROGRAM

## 2025-06-19 NOTE — PROGRESS NOTES
Sagar Gastelum M.D.   [facility]  Interventional Pain    New Patient Visit    Referring provider: No ref. provider found    No chief complaint on file.      Assessment and Plan:     Janette Whitfield is a 65 y.o. female with low back pain. A detailed and lengthy discussion was undertaken regarding the patient's presentation including history,  physical examination, past treatments, relevant laboratory, imaging results and future management strategies.      Assessment: 65 y.o. year old female with        ICD-10-CM ICD-9-CM   1. Lumbar radiculopathy, chronic  M54.16 724.4   2. Facet arthritis of lumbar region  M47.816 721.3   3. Facet arthritis of cervical region  M47.812 721.0   4. Chronic pain of both knees  M25.561 719.46    M25.562 338.29    G89.29    5. Subacromial bursitis of both shoulders  M75.51 726.19    M75.52    6. Physical deconditioning  R53.81 799.3   7. Wheelchair dependent  Z99.3 V46.3       The mentioned diagnosis is Worsening  and is accompanied by significant limitation to ADL's    1. Lumbar radiculopathy, chronic  -     Cancel: Ambulatory Referral/Consult to Physical Therapy; Future; Expected date: 06/26/2025  -     CBC Without Differential; Future; Expected date: 06/19/2025  -     Comprehensive Metabolic Panel; Future; Expected date: 06/19/2025  -     Ambulatory Referral/Consult to Physical Therapy; Future; Expected date: 06/27/2025    2. Facet arthritis of lumbar region  -     Cancel: Ambulatory Referral/Consult to Physical Therapy; Future; Expected date: 06/26/2025  -     Ambulatory Referral/Consult to Physical Therapy; Future; Expected date: 06/27/2025    3. Facet arthritis of cervical region  -     Cancel: Ambulatory Referral/Consult to Physical Therapy; Future; Expected date: 06/26/2025  -     Ambulatory Referral/Consult to Physical Therapy; Future; Expected date: 06/27/2025    4. Chronic pain of both knees  -     X-ray Knee Ortho Bilateral with Flexion; Future; Expected date:  06/19/2025    5. Subacromial bursitis of both shoulders    6. Physical deconditioning    7. Wheelchair dependent        Patient presentation is pertinent for bilateral lower lumbar facet arthritis, left lumbar radiculopathy, significant degenerative disc disease, bilateral subacromial bursitis, cervical facet arthritis, bilateral knee osteoarthritis.  Patient has significant malnutrition and deconditioning and is currently wheelchair bound.  Patient's MRI imaging performed recently shows mild to moderate changes however patient's presentation with regard through her pain appears out of proportion to her image findings on this lumbar spine.  I do believe a significant portion of her presentation is in the setting of deconditioning and muscle atrophy that the patient has undergone.  Patient was very apprehensive to physical exam and the slightest of touches or the passive movement across different joints brought about pain for her.  Given the presentation I had discussed extensively with the patient we will definitely benefit with physical therapy to address all of her complaints and might be a great candidate for inpatient rehabilitation that the patient can discuss with her primary care provider.    Plan:  The plan was clearly communicated to the patient, who verbalized understanding of the same.     Diagnostics: Reviewed the followed diagnostic results  Labs/EMG: Yes and pertinent for low vitamin D levels  Imaging:   MRI/CT:   12/11/2024 MRI Lumbar Spine Impression: No acute abnormality.  Multilevel spondylitic/disc degenerative changes with resultant canal, lateral recess, foraminal stenosis.  Possible impingement on the left S1 root in the lateral recess at L5-S1.  Decreased disc height and signal noted at L3-4, L4-5 and L5-S1, large intraosseous hemangioma at L2, Modic type 2 endplate changes at L5-S1.  Facet arthropathy is noted at L3-4.  Xray:  06/19/2025 bilateral knee x-rays order at this office  visit    Non-Pharmacologic/Non Interventional Measures  In last one year, patient attempted the following non-medication physician directed conservative measures Cold/Ice Pack for current presentation with mild-moderate benefit   Physician directed PT/OT/Chiropractic for current complaint: Patient has not previously completed physician directed physical therapy for current presentation in the last 1 year.   We also discussed extensively to consider other measures including Physician directed Physical therapy, TENs unit, Dry Needling, Massage therapy, Home exercises, Heat pad, Cold/Ice Pack, Yoga, Meditation, Acupuncture/Acupressure, and Nutrition change: Anti-Inflammatory diet to improve pain and functionality   New PT Referral: I provided a new referral for physician directed PT for current presentation on date: 06/19/2025 and my recommendations are:  Patient has bilateral lower lumbar facet arthritis, left lumbar radiculopathy significant degenerative disc disease, bilateral subacromial bursitis, cervical facet arthritis and bilateral knee osteoarthritis.  Patient with significant malnutrition and deconditioning and is currently wheelchair-bound.  Patient will significantly benefit with strengthening of paraspinal muscles in the cervical and lumbar region, improvement in range of motion across cervical lumbar spine, hip joint, knee joint and shoulder joints.    Medications:  Plan/Ordered: None for the visit  Current: Clonazepam, Duloxetine, Gabapentin 300 mg TID, Hydroxyzine, Sertraline, Tizanidine  Previous: None  Antiplatelets/Anticoagulants: ASA for 1° prevention and not on any anticoagulants  PCP: Laron Angel  Cardiologist: Saad Khan MD- [facility] [place] [facility] the [facility] in [place]    Interventions:  Injections:  Plan/Ordered:   No intervention is scheduled at this visit.  Information on the procedure was provided to the patient today. Risks and benefits were discussed. All questions were  answered.    Previous interventional procedures:  Not pertinent    Surgical Spine Interventions: Not pertinent    New Consults:  Not pertinent    Relevant Risk Factors for Interventions:  PMH:  Pertinent for anxiety, arthritis, high BMI, coronary artery disease, bipolar disorder, chronic eczema, lumbar spondylosis, diabetes mellitus, GERD, internal hemorrhoids, hypertension, hyperlipidemia, IBS, iron deficiency anemia, peripheral artery disease, sleep apnea.    PSH:  has a past surgical history that includes Cholecystectomy and Wrist surgery (Left, 03/2023), cardiac catheterization on 08/06/2024, radius open reduction and internal fixation on left distal on to 08/20/2022, knee replacement 2021, exploratory laparotomy.  Allergies: Yes and pertinent for hydrocodone, NSAIDs, tape, Tegretol, Vraylar  Other:   I reviewed the prior notes from each unique source dated 06/20/2024 with Laron Harrison (Internal Medicine)  Old Records: Decision was made to obtain the old records which are summarized as  office notes from primary care at [facility].  Follow Up: Plan for patient to follow up  4-6 week(s)    Current Visit Imaging Interpretation:  I independently visualized/interpreted the following images dated 12/11/2024 MRI Lumbar Spine    Pertinent findings include:  No acute abnormality.  Multilevel spondylitic/disc degenerative changes with resultant canal, lateral recess, foraminal stenosis.  Possible impingement on the left S1 root in the lateral recess at L5-S1.  Decreased disc height and signal noted at L3-4, L4-5 and L5-S1, large intraosseous hemangioma at L2, Modic type 2 endplate changes at L5-S1.  Facet arthropathy is noted at L3-4.  Key Images:       The above plan and management options were discussed at length with patient. Patient is in agreement with the above and verbalized understanding.    - I discussed the goals of interventional chronic pain management with the patient on today's visit. I  explained the utility of injections for diagnostic and therapeutic purposes.  We discussed a multimodal approach to pain including treating the patient's given worst pain at any given time.  We will use a systematic approach to addressing pain.  We will also adopt a multimodal approach that includes injections, adjuvant medications, physical therapy, at times psychiatry.  There may be a limited role for opioid use intermittently in the treatment of pain, more particularly for acute pain although no one approach can be used as a sole treatment modality. I emphasized the importance of regular exercise, core strengthening and stretching, diet and weight loss as a cornerstone of long-term pain management.    - This condition does not require this patient to take time off of work, and the primary goal of our Pain Management services is to improve the patient's functional capacity.  - Patient Questions: Answered all of the patient's questions regarding diagnoses, therapy, treatment and next steps      History of Present Illness:     HPI Summary    Pain Disability Index:      6/19/2025     3:30 PM   Last 3 PDI Scores   Pain Disability Index (PDI) 70       Pain Assessment  Onset: Awakened from sleep  Approximate duration of pain: 1 year(s)  Context/Inciting Event:Other traveling- was standing to get into truck and it hurt her back   Progression: Worsening - gradually  Location: Lumbar Spine/Low Back  Radiation: to buttox, legs all the way down to the toes  Description/Character: Burning, Pressure, and Stabbing  Frequency and Timing: Constant and is worse in Morning, Afternoon, Evening, and Night time  Pain Score: Today the pain score is 10/10. Pain scores range from 10/10 - 10/10  Symptoms Worse With: Sitting - instantly and Laying in bed - 1-5 mins, can't walk, can't stand  Symptoms Improved With: Meditation  Functional/Activity Limitations: yes, Symptoms interfere with daily activity and sleeping    Associated  Conditions:  Numbness: yes along Left Lower Extremity  Weakness: yes along Left Lower Extremity  Balance Problems: yes  Recent Falls: no  Sleep problems because of pain: yes  H/o Depression and anxiety requiring medications: yes  Long term use of opioids >3 months if currently on one: no  Inflammatory conditions like Rheumatoid/Ankylosing spondylitis/Psoriatic: yes     Patient denies night fever/night sweats, urinary incontinence, bowel incontinence, significant weight loss, significant motor weakness, and loss of sensations      Previous Treatments:    Non-Medication Measures in last 1 year  In last one year, patient attempted the following non-medication physician directed conservative measures Cold/Ice Pack for current presentation with mild-moderate benefit   Physician directed PT/OT/Chiropractic for current complaint: Patient has not previously completed physician directed physical therapy for current presentation in the last 1 year.     Medications  Current: Clonazepam, Duloxetine, Gabapentin 300 mg TID, Hydroxyzine, Sertraline, Tizanidine  Previous: None  Antiplatelets/Anticoagulants: ASA for 1° prevention and not on any anticoagulants  PCP: Laron Angel  Cardiologist: Saad Khan MD- [facility] [place] [facility] the [facility] in [place]    Interventional Procedures  Injections with other providers: Yes and pertinent for previous knee injection  Surgery of the area of pain: Not pertinent  Other: Not pertinent    What do you think helps most with pain in the last 6 months to 1 year: None     Review:  Reviewed and consistent with medication use as prescribed.      Review of patient's allergies indicates:   Allergen Reactions    Meperidine Other (See Comments) and Shortness Of Breath     Pt stated it gives her the demerol jumps  Pt stated it gives her the demerol jumps      Brexpiprazole     Cariprazine     Hydrocodone Nausea Only       Past Medical History:   Diagnosis Date    Anemia, unspecified      Cataract     Depression     Diabetes mellitus     Hypertension        Past Surgical History:   Procedure Laterality Date    CHOLECYSTECTOMY      WRIST SURGERY Left 03/2023        reports that she has never smoked. She has never used smokeless tobacco. She reports that she does not currently use alcohol. She reports that she does not use drugs.    No family history on file.    Current Outpatient Medications   Medication Sig    albuterol (PROVENTIL) 2.5 mg /3 mL (0.083 %) nebulizer solution Take 2.5 mg by nebulization every 6 (six) hours as needed.    amLODIPine (NORVASC) 10 MG tablet Take 10 mg by mouth once daily.    aspirin (ECOTRIN) 81 MG EC tablet Take 81 mg by mouth once daily.    atorvastatin (LIPITOR) 40 MG tablet Take 40 mg by mouth once daily.    bismuth-metronidazole-tetracycline (PLYERA) 140-125-125 mg per capsule Take 3 capsules by mouth 4 (four) times daily before meals and nightly.    clonazePAM (KLONOPIN) 2 MG Tab Take 2 mg by mouth 2 (two) times daily.    DULoxetine (CYMBALTA) 60 MG capsule Take 60 mg by mouth 2 (two) times daily.    dupilumab (DUPIXENT PEN) 300 mg/2 mL PnIj Inject 300 mg into the skin every 14 (fourteen) days.    ferrous sulfate (FEOSOL) 325 mg (65 mg iron) Tab tablet Take 1 tablet (325 mg total) by mouth once daily.    fluconazole (DIFLUCAN) 100 MG tablet TAKE ONE TABLET BY MOUTH EVERY DAY FOR FIVE DAYS    fluticasone propionate (FLONASE) 50 mcg/actuation nasal spray 1 spray by Nasal route 2 (two) times daily.    furosemide (LASIX) 80 MG tablet Take 80 mg by mouth daily as needed.    gabapentin (NEURONTIN) 300 MG capsule Take 300 mg by mouth 3 (three) times daily.    hydrOXYzine (ATARAX) 50 MG tablet Take 50 mg by mouth every evening.    metFORMIN (GLUCOPHAGE) 500 MG tablet Take 500 mg by mouth 2 (two) times daily.    metoprolol succinate (TOPROL-XL) 50 MG 24 hr tablet Take 50 mg by mouth once daily.    nystatin-triamcinolone (MYCOLOG II) cream apply TO RASH THREE TIMES DAILY     pantoprazole (PROTONIX) 40 MG tablet Take 40 mg by mouth once daily.    phenazopyridine (PYRIDIUM) 100 MG tablet TAKE ONE TABLET BY MOUTH THREE TIMES DAILY AS NEEDED FOR BLADDER SPASMS    PREMARIN vaginal cream INSERT 1 GRAM VAGINALLY AT BEDTIME FOR TWO WEEKS, THEN 1/2 GRAM AT BEDTIME FOR TWO WEEKS, THEN 1/2 GRAM AT BEDTIME THREE TIMES a WEEK THEREAFTER.    PROCTO-MED HC 2.5 % rectal cream INSERT 1 APPLICATORFUL EXTERNALLY TWICE DAILY FOR 10 DAYS    sertraline (ZOLOFT) 100 MG tablet Take 100 mg by mouth.    TEGRETOL  mg Tb12 Take 400 mg by mouth 2 (two) times daily.    tiZANidine (ZANAFLEX) 4 MG tablet Take 4 mg by mouth 2 (two) times daily.    traZODone (DESYREL) 100 MG tablet Take 200 mg by mouth every evening.    VITAMIN D2 1,250 mcg (50,000 unit) capsule Take 50,000 Units by mouth every 7 days.    brexpiprazole (REXULTI) 2 mg Tab Take 2 mg by mouth. (Patient not taking: Reported on 6/19/2025)    famotidine (PEPCID) 40 MG tablet Take 40 mg by mouth 2 (two) times daily. (Patient not taking: Reported on 6/19/2025)    folic acid (FOLVITE) 1 MG tablet Take 1 tablet (1,000 mcg total) by mouth once daily. (Patient not taking: Reported on 6/19/2025)    TRULICITY 0.75 mg/0.5 mL pen injector INJECT 0.75MG UNDER THE SKIN ONCE A WEEK (Patient not taking: Reported on 6/19/2025)     No current facility-administered medications for this visit.       Review of Systems:     Review of Systems   Constitutional: Negative.  Negative for chills, diaphoresis and fever.   HENT: Negative.     Eyes: Negative.    Respiratory: Negative.  Negative for shortness of breath and wheezing.    Cardiovascular: Negative.  Negative for chest pain and palpitations.   Gastrointestinal: Negative.  Negative for blood in stool.   Genitourinary: Negative.    Musculoskeletal:         Refer to HPI   Skin: Negative.  Negative for rash.   Neurological:  Negative for tremors and speech change.   Endo/Heme/Allergies: Negative.    Psychiatric/Behavioral:  "Negative.         PHYSICAL EXAM:   Visit Vitals  /74 (BP Location: Left arm, Patient Position: Sitting)   Pulse 78   Ht 5' 5" (1.651 m)   Wt 113.4 kg (250 lb)   SpO2 (!) 94%   BMI 41.60 kg/m²       General Appearance: healthy, well developed, well nourished, appropriately dressed  Mental Status: Alert, oriented, thought content appropriate; Normal affect  Psych: Mood and affect appropriate  Head: Normocephalic, atraumatic  Eyes: Extraocular movements intact.   Neck: No asymmetry, masses or scars; supple; midline   Skin: Warm and dry; No rashes visible on exposed areas  Lung: Respiratory effort normal, symmetrical chest rise  Cardiovascular: RRR with palpation of the radial artery.    Neuro:  Motor & Sensory:     Strength and Sensation:     Nerve L2 L3 L4/L5 L5 S1 S2   Muscle Group Hip Flexion Hip  Adduction Knee Extension Ankle Dorsiflexion Toe  Dorsiflexion Hip  Abduction Ankle Plantarflexion Toe  Plantarflexion   Right 5/5 5/5 5/5 5/5 5/5 5/5 5/5 5/5   Left 5/5 5/5 5/5 5/5 5/5 5/5 5/5 5/5     Nerve Root (area) Light Touch    R L   L3 (anterior thigh/medial knee) 2 2   L4 (lateral thigh/ant. knee/medial foot border) 2 2   L5 (lateral leg/dorsum foot below 2-4 toes) 2 2   S1 (lateral foot border) 2 2   S2 (plantar heel) 2 2     Reflexes:    R L   Patellar (L4) 2+ 2+   Medial Hamstring (L5) 2+ 2+   Achilles (S1) 2+ 2+       Upper Tract Signs:    R L   Perez's Neg for flexion of thumb & Index finger Neg for flexion of thumb & Index finger   Plantar Response Down-going Down-going   Clonus Neg at ankle Neg at ankle     Gait:   Heel walking (L4/5 - anterior tibialis): Absent  Toe walking (S1/2 - gastrocnemius/soleus): Absent  Tandem walking (Balance/coordination): Absent      MSK:    Lumbar & SIJ Exam  Inspection:   Skin: Negative for scars, signs of infection/inflammation, masses  Contour: Lack of lumbar lordosis    Palpation:  Spinous Process: No TTP   Paraspinal Muscles: TTP along upper and lower lumbar " "spine  TTP along bilateral knee joint, bilateral GTB, bilateral SI joint    ROM:   Moderately Limited and Significantly Limited with moderate pain and significant pain  for Flexion, Extension, Rotation, and Lateral Bending      Special Tests  Facet Loading: Positive bilaterally  Straight Leg Raise (L5-S1):  Unable to perform  Slump Test (L4-S1):  Unable to perform  Femoral Stretch (L2-4):  Unable to perform    SI Joint Exam R L   Maricruz Finger + +   Gaenslen's Test + +   DA Test + +       Data     Imaging of Relevance:     Lumbar Spine  MRI Imaging  12/11/2024 MRI Lumbar Spine Impression: No acute abnormality.  Multilevel spondylitic/disc degenerative changes with resultant canal, lateral recess, foraminal stenosis.  Possible impingement on the left S1 root in the lateral recess at L5-S1.  Decreased disc height and signal noted at L3-4, L4-5 and L5-S1, large intraosseous hemangioma at L2, Modic type 2 endplate changes at L5-S1.  Facet arthropathy is noted at L3-4.        CT Imaging  No results found for this or any previous visit.        Xray Imaging  No results found for this or any previous visit.      Labs of Relevance:  Chemistry:  Lab Results   Component Value Date     05/28/2024    K 3.7 05/28/2024    BUN 8 05/28/2024    CREATININE 0.7 05/28/2024    EGFRNORACEVR >60.0 05/28/2024    CALCIUM 9.2 05/28/2024    ALKPHOS 112 05/28/2024    ALBUMIN 3.2 (L) 05/28/2024    AST 17 05/28/2024    ALT 14 05/28/2024    TUHTKMIY16NY 27 (L) 05/22/2023        No results found for: "HGBA1C"     Hematology:  Lab Results   Component Value Date    WBC 9.22 05/28/2024    HGB 12.7 05/28/2024    HCT 38.9 05/28/2024     05/28/2024    No results found for: "INR", "PROTIME"      Sagar Gastelum MD  Interventional Pain Management  [facility]     Disclaimer:  This note was prepared using voice recognition system and is likely to have sound alike errors that may have been overlooked even after proof reading.  " Please call me with any questions

## 2025-06-20 PROBLEM — R53.81 PHYSICAL DECONDITIONING: Status: ACTIVE | Noted: 2025-06-20

## 2025-06-20 PROBLEM — M75.52 SUBACROMIAL BURSITIS OF BOTH SHOULDERS: Status: ACTIVE | Noted: 2025-06-20

## 2025-06-20 PROBLEM — M75.51 SUBACROMIAL BURSITIS OF BOTH SHOULDERS: Status: ACTIVE | Noted: 2025-06-20

## 2025-06-20 PROBLEM — M25.562 CHRONIC PAIN OF BOTH KNEES: Status: ACTIVE | Noted: 2025-06-20

## 2025-06-20 PROBLEM — M25.561 CHRONIC PAIN OF BOTH KNEES: Status: ACTIVE | Noted: 2025-06-20

## 2025-06-20 PROBLEM — M47.816 FACET ARTHRITIS OF LUMBAR REGION: Status: ACTIVE | Noted: 2025-06-20

## 2025-06-20 PROBLEM — G89.29 CHRONIC PAIN OF BOTH KNEES: Status: ACTIVE | Noted: 2025-06-20

## 2025-06-20 PROBLEM — M54.16 LUMBAR RADICULOPATHY, CHRONIC: Status: ACTIVE | Noted: 2025-06-20

## 2025-06-20 PROBLEM — Z99.3 WHEELCHAIR DEPENDENT: Status: ACTIVE | Noted: 2025-06-20

## 2025-06-20 PROBLEM — M47.812 FACET ARTHRITIS OF CERVICAL REGION: Status: ACTIVE | Noted: 2025-06-20
